# Patient Record
Sex: MALE | Race: ASIAN | NOT HISPANIC OR LATINO | Employment: FULL TIME | URBAN - METROPOLITAN AREA
[De-identification: names, ages, dates, MRNs, and addresses within clinical notes are randomized per-mention and may not be internally consistent; named-entity substitution may affect disease eponyms.]

---

## 2019-10-21 ENCOUNTER — COMMUNICATION - HEALTHEAST (OUTPATIENT)
Dept: TELEHEALTH | Facility: CLINIC | Age: 39
End: 2019-10-21

## 2019-10-21 ENCOUNTER — OFFICE VISIT - HEALTHEAST (OUTPATIENT)
Dept: FAMILY MEDICINE | Facility: CLINIC | Age: 39
End: 2019-10-21

## 2019-10-21 DIAGNOSIS — Z00.00 WELL ADULT EXAM: ICD-10-CM

## 2019-10-21 DIAGNOSIS — F33.0 MILD EPISODE OF RECURRENT MAJOR DEPRESSIVE DISORDER (H): ICD-10-CM

## 2019-10-21 DIAGNOSIS — H90.3 ASYMMETRICAL SENSORINEURAL HEARING LOSS: ICD-10-CM

## 2019-10-21 LAB
ALBUMIN SERPL-MCNC: 4.2 G/DL (ref 3.5–5)
ALP SERPL-CCNC: 106 U/L (ref 45–120)
ALT SERPL W P-5'-P-CCNC: 40 U/L (ref 0–45)
ANION GAP SERPL CALCULATED.3IONS-SCNC: 11 MMOL/L (ref 5–18)
AST SERPL W P-5'-P-CCNC: 19 U/L (ref 0–40)
BILIRUB SERPL-MCNC: 0.8 MG/DL (ref 0–1)
BUN SERPL-MCNC: 13 MG/DL (ref 8–22)
CALCIUM SERPL-MCNC: 9.6 MG/DL (ref 8.5–10.5)
CHLORIDE BLD-SCNC: 105 MMOL/L (ref 98–107)
CHOLEST SERPL-MCNC: 213 MG/DL
CO2 SERPL-SCNC: 22 MMOL/L (ref 22–31)
CREAT SERPL-MCNC: 0.78 MG/DL (ref 0.7–1.3)
ERYTHROCYTE [DISTWIDTH] IN BLOOD BY AUTOMATED COUNT: 10.4 % (ref 11–14.5)
FASTING STATUS PATIENT QL REPORTED: YES
GFR SERPL CREATININE-BSD FRML MDRD: >60 ML/MIN/1.73M2
GLUCOSE BLD-MCNC: 117 MG/DL (ref 70–125)
HCT VFR BLD AUTO: 53.7 % (ref 40–54)
HDLC SERPL-MCNC: 42 MG/DL
HGB BLD-MCNC: 18.3 G/DL (ref 14–18)
LDLC SERPL CALC-MCNC: 146 MG/DL
MCH RBC QN AUTO: 31.9 PG (ref 27–34)
MCHC RBC AUTO-ENTMCNC: 34 G/DL (ref 32–36)
MCV RBC AUTO: 94 FL (ref 80–100)
PLATELET # BLD AUTO: 264 THOU/UL (ref 140–440)
PMV BLD AUTO: 8.2 FL (ref 7–10)
POTASSIUM BLD-SCNC: 4.4 MMOL/L (ref 3.5–5)
PROT SERPL-MCNC: 7 G/DL (ref 6–8)
RBC # BLD AUTO: 5.72 MILL/UL (ref 4.4–6.2)
SODIUM SERPL-SCNC: 138 MMOL/L (ref 136–145)
TRIGL SERPL-MCNC: 127 MG/DL
TSH SERPL DL<=0.005 MIU/L-ACNC: 2.31 UIU/ML (ref 0.3–5)
WBC: 7.5 THOU/UL (ref 4–11)

## 2019-10-21 RX ORDER — BUPROPION HYDROCHLORIDE 150 MG/1
150 TABLET, EXTENDED RELEASE ORAL 2 TIMES DAILY
Qty: 60 TABLET | Refills: 2 | Status: SHIPPED | OUTPATIENT
Start: 2019-10-21

## 2019-10-21 ASSESSMENT — MIFFLIN-ST. JEOR: SCORE: 1670.36

## 2019-10-21 ASSESSMENT — PATIENT HEALTH QUESTIONNAIRE - PHQ9: SUM OF ALL RESPONSES TO PHQ QUESTIONS 1-9: 5

## 2019-10-22 LAB — 25(OH)D3 SERPL-MCNC: 20.6 NG/ML (ref 30–80)

## 2019-10-23 ENCOUNTER — COMMUNICATION - HEALTHEAST (OUTPATIENT)
Dept: FAMILY MEDICINE | Facility: CLINIC | Age: 39
End: 2019-10-23

## 2019-12-02 ENCOUNTER — OFFICE VISIT - HEALTHEAST (OUTPATIENT)
Dept: FAMILY MEDICINE | Facility: CLINIC | Age: 39
End: 2019-12-02

## 2019-12-02 DIAGNOSIS — F32.A DEPRESSION, UNSPECIFIED DEPRESSION TYPE: ICD-10-CM

## 2019-12-02 DIAGNOSIS — H65.93 OME (OTITIS MEDIA WITH EFFUSION), BILATERAL: ICD-10-CM

## 2019-12-02 DIAGNOSIS — Z71.6 ENCOUNTER FOR SMOKING CESSATION COUNSELING: ICD-10-CM

## 2019-12-02 ASSESSMENT — MIFFLIN-ST. JEOR: SCORE: 1661.29

## 2021-05-26 ASSESSMENT — PATIENT HEALTH QUESTIONNAIRE - PHQ9: SUM OF ALL RESPONSES TO PHQ QUESTIONS 1-9: 5

## 2021-05-30 ENCOUNTER — RECORDS - HEALTHEAST (OUTPATIENT)
Dept: ADMINISTRATIVE | Facility: CLINIC | Age: 41
End: 2021-05-30

## 2021-06-02 NOTE — PATIENT INSTRUCTIONS - HE
Advised health diet, 6-8 fruit and vegetables daily and discussed juicing in am with protein powder for breakfast.  Advised regular exercise, discussed regular aerobic exercise and strength training.  Caffeine: not more than 2 daily.  Water: 6-8 glasses daily.  Multivitamin with Vitamin D 2000 units.  ER if symptoms worsen during treatment. Make a verbal contract with a family member today to take you to the ER if you develop any suicidal thoughts and check in with this person daily about your symptoms.

## 2021-06-02 NOTE — PROGRESS NOTES
Labs are normal, a slightly elevated total cholesterol and LDL, try incorporating more fish and fish oil in your daily diet, can take Krill oil supplements to help with this.  Not necessary to start a cholesterol-lowering medication at this time.  Hemoglobin was a bit elevated, could be because you were dehydrated because you were fasting that day.  Try to increase your water to 6 to 8 glasses a day.  Please call results to the patient or send a letter if not reachable by phone.

## 2021-06-02 NOTE — TELEPHONE ENCOUNTER
----- Message from Justyna Dobbs PA-C sent at 10/22/2019  1:16 PM CDT -----  Vitamin D is mildly low, take 2000 units of vitamin D daily, please call results to the patient or send a letter if not reachable by phone.

## 2021-06-02 NOTE — TELEPHONE ENCOUNTER
Labs are normal, a slightly elevated total cholesterol and LDL, try incorporating more fish and fish oil in your daily diet, can take Krill oil supplements to help with this.  Not necessary to start a cholesterol-lowering medication at this time.  Hemoglobin was a bit elevated, could be because you were dehydrated because you were fasting that day.  Try to increase your water to 6 to 8 glasses a day.

## 2021-06-02 NOTE — PROGRESS NOTES
Vitamin D is mildly low, take 2000 units of vitamin D daily, please call results to the patient or send a letter if not reachable by phone.

## 2021-06-03 VITALS
SYSTOLIC BLOOD PRESSURE: 110 MMHG | WEIGHT: 178.5 LBS | HEIGHT: 67 IN | BODY MASS INDEX: 28.02 KG/M2 | DIASTOLIC BLOOD PRESSURE: 70 MMHG | HEART RATE: 62 BPM

## 2021-06-03 NOTE — PROGRESS NOTES
HPI:  Derek Pena is a 39 y.o. male who is seen for   Chief Complaint   Patient presents with     Follow-up   Derek Pena is seen in follow-up to starting Wellbutrin for smoking cessation and depression.  He did not  this medication due to the cost.  He continues to talk to his girlfriend regularly about his depressive symptoms.  He denies suicidal or homicidal he has not looked into other counseling opportunities.  He is continued to smoke.  He also has a new concern of over 1 week of cough, congestion, ear fullness, sinus pressure, malaise.  He does not have fever, nausea, vomiting, shortness of breath, wheezing.  Review of systems as in HPI.    No results found for: HGBA1C  Lab Results   Component Value Date    LDLCALC 146 (H) 10/21/2019    CREATININE 0.78 10/21/2019     There is no problem list on file for this patient.    Family History   Problem Relation Age of Onset     Diabetes Mother      Hypertension Mother      Hyperlipidemia Mother      Social History     Socioeconomic History     Marital status:      Spouse name: Not on file     Number of children: 3     Years of education: Not on file     Highest education level: Not on file   Occupational History     Not on file   Social Needs     Financial resource strain: Not on file     Food insecurity:     Worry: Not on file     Inability: Not on file     Transportation needs:     Medical: Not on file     Non-medical: Not on file   Tobacco Use     Smoking status: Current Every Day Smoker     Packs/day: 0.00     Types: Cigarettes   Substance and Sexual Activity     Alcohol use: Not on file     Drug use: Not on file     Sexual activity: Not on file   Lifestyle     Physical activity:     Days per week: Not on file     Minutes per session: Not on file     Stress: Not on file   Relationships     Social connections:     Talks on phone: Not on file     Gets together: Not on file     Attends Congregational service: Not on file     Active member of club or organization:  Not on file     Attends meetings of clubs or organizations: Not on file     Relationship status: Not on file     Intimate partner violence:     Fear of current or ex partner: No     Emotionally abused: No     Physically abused: No     Forced sexual activity: No   Other Topics Concern     Not on file   Social History Narrative     Not on file     No past surgical history on file.  Current Outpatient Medications on File Prior to Visit   Medication Sig Dispense Refill     buPROPion (WELLBUTRIN SR) 150 MG 12 hr tablet Take 1 tablet (150 mg total) by mouth 2 (two) times a day. 60 tablet 2     No current facility-administered medications on file prior to visit.      No Known Allergies    I have reviewed the patient's medical history in detail and updated the computerized patient record.  OBJECTIVE:  Wt Readings from Last 3 Encounters:   12/02/19 176 lb 8 oz (80.1 kg)   10/21/19 178 lb 8 oz (81 kg)     Temp Readings from Last 3 Encounters:   No data found for Temp     BP Readings from Last 3 Encounters:   12/02/19 120/70   10/21/19 110/70     Pulse Readings from Last 3 Encounters:   10/21/19 62     Body mass index is 28.06 kg/m .     Alert, cooperative, well-hydrated. Appears well.  Eyes: Pupils equal, round, reactive to light.  HEENT: Sclera white, nares patent, MMM, TM's dull and injected bilaterally, EACs are without lesions bilaterally.  Nose: Sinuses are pale, boggy, no erythema or exudates.  Lungs: Clear to auscultation. No retractions, no increased work of respiration, equal chest rise.   Heart: Regular rate and rhythm, no murmurs, clicks,   Gallops.  Extremities: no tenderness to palpation of gastrocnemius, bilaterally.  Skin: no increased warmth, edema, or erythema of lower legs bilaterally.  Mood: Affect flat, no pressured speech, no psychomotor agitation, no suicidal or homicidal ideations.  Labs:  Physical on 10/21/2019   Component Date Value     WBC 10/21/2019 7.5      RBC 10/21/2019 5.72      Hemoglobin  10/21/2019 18.3*     Hematocrit 10/21/2019 53.7      MCV 10/21/2019 94      MCH 10/21/2019 31.9      MCHC 10/21/2019 34.0      RDW 10/21/2019 10.4*     Platelets 10/21/2019 264      MPV 10/21/2019 8.2      Cholesterol 10/21/2019 213*     Triglycerides 10/21/2019 127      HDL Cholesterol 10/21/2019 42      LDL Calculated 10/21/2019 146*     Patient Fasting > 8hrs? 10/21/2019 Yes      TSH 10/21/2019 2.31      Sodium 10/21/2019 138      Potassium 10/21/2019 4.4      Chloride 10/21/2019 105      CO2 10/21/2019 22      Anion Gap, Calculation 10/21/2019 11      Glucose 10/21/2019 117      BUN 10/21/2019 13      Creatinine 10/21/2019 0.78      GFR MDRD Af Amer 10/21/2019 >60      GFR MDRD Non Af Amer 10/21/2019 >60      Bilirubin, Total 10/21/2019 0.8      Calcium 10/21/2019 9.6      Protein, Total 10/21/2019 7.0      Albumin 10/21/2019 4.2      Alkaline Phosphatase 10/21/2019 106      AST 10/21/2019 19      ALT 10/21/2019 40      Vitamin D, Total (25-Hyd* 10/21/2019 20.6*     ASSESMENT/PLAN:  1. OME (otitis media with effusion), bilateral  amoxicillin (AMOXIL) 875 MG tablet   2. Encounter for smoking cessation counseling     3. Depression, unspecified depression type     1.  Discussed the use of amoxicillin for ear infections.  Also gave list of over-the-counter medications to help with cough.  2.  Discussed smoking cessation options, gave 800-number for [place] smoking cessation program.  3.  Discussed his current depressive symptoms and advised him to follow-up in his EAP at work for free counseling or low cost counseling.  Follow-up if symptoms are not improving with current plan.    Justyna Dobbs, MS, PA-C 12/02/19

## 2021-06-03 NOTE — PATIENT INSTRUCTIONS - HE
Nasal saline.  Mucinex, decongestant, antihistamine as needed.  2. Humidifier, push fluids, rest, acetaminophen as needed q 4-6 hours for fever and myalgias.    Quit Plan [place] -  for nicotine patches, gum or lozenges.     Employee Assistance Program - contact your Human Resources Department at work for counseling services which are usually free.

## 2021-06-04 VITALS
DIASTOLIC BLOOD PRESSURE: 70 MMHG | HEIGHT: 67 IN | WEIGHT: 176.5 LBS | BODY MASS INDEX: 27.7 KG/M2 | SYSTOLIC BLOOD PRESSURE: 120 MMHG

## 2021-06-19 NOTE — LETTER
Letter by Justyna Dobbs PA-C at      Author: Justyna Dobbs PA-C Service: -- Author Type: --    Filed:  Encounter Date: 10/23/2019 Status: Signed         [address] [place] [address]             October 23, 2019         Dear Mr. Pena,    Below are the results from your recent visit:    Labs are normal, a slightly elevated total cholesterol and LDL, try incorporating more fish and fish oil in your daily diet, can take Krill oil supplements to help with this.  Not necessary to start a cholesterol-lowering medication at this time.  Hemoglobin was a bit elevated, could be because you were dehydrated because you were fasting that day.  Try to increase your water to 6 to 8 glasses a day.    Resulted Orders   HM2(CBC w/o Differential)   Result Value Ref Range    WBC 7.5 4.0 - 11.0 thou/uL    RBC 5.72 4.40 - 6.20 mill/uL    Hemoglobin 18.3 (H) 14.0 - 18.0 g/dL    Hematocrit 53.7 40.0 - 54.0 %    MCV 94 80 - 100 fL    MCH 31.9 27.0 - 34.0 pg    MCHC 34.0 32.0 - 36.0 g/dL    RDW 10.4 (L) 11.0 - 14.5 %    Platelets 264 140 - 440 thou/uL    MPV 8.2 7.0 - 10.0 fL   Lipid Cascade   Result Value Ref Range    Cholesterol 213 (H) <=199 mg/dL    Triglycerides 127 <=149 mg/dL    HDL Cholesterol 42 >=40 mg/dL    LDL Calculated 146 (H) <=129 mg/dL    Patient Fasting > 8hrs? Yes    Thyroid Cascade   Result Value Ref Range    TSH 2.31 0.30 - 5.00 uIU/mL   Comprehensive Metabolic Panel   Result Value Ref Range    Sodium 138 136 - 145 mmol/L    Potassium 4.4 3.5 - 5.0 mmol/L    Chloride 105 98 - 107 mmol/L    CO2 22 22 - 31 mmol/L    Anion Gap, Calculation 11 5 - 18 mmol/L    Glucose 117 70 - 125 mg/dL    BUN 13 8 - 22 mg/dL    Creatinine 0.78 0.70 - 1.30 mg/dL    GFR MDRD Af Amer >60 >60 mL/min/1.73m2    GFR MDRD Non Af Amer >60 >60 mL/min/1.73m2    Bilirubin, Total 0.8 0.0 - 1.0 mg/dL    Calcium 9.6 8.5 - 10.5 mg/dL    Protein, Total 7.0 6.0 - 8.0 g/dL    Albumin 4.2 3.5 - 5.0 g/dL    Alkaline  Phosphatase 106 45 - 120 U/L    AST 19 0 - 40 U/L    ALT 40 0 - 45 U/L    Narrative    Fasting Glucose reference range is 70-99 mg/dL per  American Diabetes Association (ADA) guidelines.           Please call with questions or contact us using [facility].    Sincerely,        Electronically signed by Justyna Dobbs PA-C

## 2021-06-28 NOTE — PROGRESS NOTES
Progress Notes by Justyna Dobbs PA-C at 10/21/2019  8:50 AM     Author: Justyna Dobbs PA-C Service: -- Author Type: Physician Assistant    Filed: 10/23/2019  2:03 PM Encounter Date: 10/21/2019 Status: Signed    : Justyna Dobbs PA-C (Physician Assistant)       MALE PREVENTATIVE EXAM    Assessment and Plan:       1. Well adult exam  Advised health diet, 6-8 fruit and vegetables daily and discussed juicing in am with protein powder for breakfast.  Advised regular exercise, discussed regular aerobic exercise and strength training.  Advised not more than 1 alcoholic drinks in any given day.  Caffeine: not more than 2 daily.  Water: 6-8 glasses daily.  Multivitamin with Vitamin D 2000 units and iron for women.      - HM2(CBC w/o Differential); Future  - Lipid Cascade; Future  - Thyroid [place]; Future  - Comprehensive Metabolic Panel; Future  - HM2(CBC w/o Differential)  - Lipid Cascade  - Thyroid [place]  - Comprehensive Metabolic Panel    2. Mild episode of recurrent major depressive disorder (H)  Advised on use of Wellbutrin for depression and smoking cessation. Reviewed side effects. ER if symptoms worsen during treatment. Make a verbal contract with a family member today to take you to the ER if you develop any suicidal thoughts and check in with this person daily about your symptoms.  Follow up for recheck in 2 weeks.    - Vitamin D, Total (25-Hydroxy); Future  - buPROPion (WELLBUTRIN SR) 150 MG 12 hr tablet; Take 1 tablet (150 mg total) by mouth 2 (two) times a day.  Dispense: 60 tablet; Refill: 2  - PHQ9 Depression Screen  - Vitamin D, Total (25-Hydroxy)    3. Asymmetrical sensorineural hearing loss  Advised to bring reports from his work assessments. Use best available hearing protection daily at work.   - Ambulatory referral to Audiology     Next follow up:  Return in about 2 weeks (around 11/4/2019) for Recheck, Depression, recheck with E-Visit; 6 week  recheck.    Immunization  Review  Adult Imm Review: Due today, orders placed  counseled on smoking cessation and Wellbutrin started    I discussed the following with the patient:   Adult Healthy Living: Importance of regular exercise  Healthy nutrition  Supplement use  smoking cessation        Subjective:   Chief Complaint: Derek Pena is an 39 y.o. male here for a preventative health visit.. he ehas se     HPI:  Derek Pena is seen for a physical. He has had a history of smoking 1/2 ppd of cigarettes for 15 years. 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 He is  and has 3 children. He does walk for activity with his children and his oldest is starting soccer. He has a history of depression and would like to consider treatment and quitting smoking.   Little interest or pleasure in doing things: Several days  Feeling down, depressed, or hopeless: Several days  Trouble falling or staying asleep, or sleeping too much: Not at all  Feeling tired or having little energy: Several days  Poor appetite or overeating: Not at all  Feeling bad about yourself - or that you are a failure or have let yourself or your family down: Several days  Trouble concentrating on things, such as reading the newspaper or watching television: Several days  Moving or speaking so slowly that other people could have noticed. Or the opposite - being so fidgety or restless that you have been moving around a lot more than usual: Not at all  Thoughts that you would be better off dead, or of hurting yourself in some way: Not at all  PHQ-9 Total Score: 5  ROS: Patient denies fever, chills, sweats, fainting, fatigue, weight change, dizziness, sleep problems, chest pain, palpitations, shortness of breath, wheezing, cough,  sore throat, changes in hearing, ear pain,tinnitus,  disphagia, sore throat, globus, changes in vision, eye pain eye  "redness, acid reflux, nausea, vomiting, diarrhea, constipation, black or bloody stools,  Dysuria, frequency, urinary incontinence, nocturia, hematuria, back pain,joint pain, bone pain, muscle cramps,edema, weakness, numbness, tingling of extremities, rash, itching, skin changes, swollen lymph nodes, thirst, increased urination, breast lumps, breast pain, nipple discharge, memory difficulties, anxiety, mood swings, (female)vaginal discharge, dyspareunia, menorrhagia, pelvic pain, sexual dysfunction,   (male) testicular lumps, erectile dysfunction.      Healthy Habits  Are you taking a daily aspirin? No  Do you typically exercising at least 40 min, 3-4 times per week?  NO  Do you usually eat at least 4 servings of fruit and vegetables a day, include whole grains and fiber and avoid regularly eating high fat foods? NO  Have you had an eye exam in the past two years? NO  Do you see a dentist twice per year? NO  Do you have any concerns regarding sleep? No    Safety Screen    No data recorded    Review of Systems:  Please see above.  The rest of the review of systems are negative for all systems.     Cancer Screening     Patient has no health maintenance due at this time          Patient Care Team:  Provider, No Primary Care as PCP - General        History     Reviewed By Date/Time Sections Reviewed    Justyna Dobbs PA-C 10/23/2019  2:01 PM Relationships    Justyna Dobbs PA-C 10/23/2019  2:00 PM Darlene Montoya 10/21/2019  8:40 AM Alcohol, Drug Use, Sexual Activity            Objective:   Vital Signs:   Visit Vitals  /70 (Patient Site: Right Arm, Patient Position: Sitting, Cuff Size: Adult Regular)   Pulse 62   Ht 5' 6.5\" (1.689 m)   Wt 178 lb 8 oz (81 kg)   BMI 28.38 kg/m           PHYSICAL EXAM  PHYSICAL EXAM  Alert, cooperative, well-hydrated.  Appears well.  Eyes: Pupils equal, round, reactive to light.  HEENT: Sclera white, nares patent, MMM, TM's pearly bilaterally, neck " supple, no lymph adenopathy, thyroid without organomegaly and freely movable, no nodules or masses.   Lungs: Clear to auscultation. No retractions, no increased work of respiration, equal chest rise.   Heart: Regular rate and rhythm, no murmurs, clicks,    Gallops.Sitting/StandingiSupine.  Abdomen: Soft, bowel sounds in 4 quadrants with no tenderness to palpation, no organomegaly or masses, no aortic or renal bruits.  Extremities: no tenderness to palpation of gastrocnemius, bilaterally.  Skin: no increased warmth, edema, or erythema of lower legs bilaterally.  Back:  No cervical, thoracic or lumbar tenderness to spinous processes or musculature.    Neuro::pupils equal and reactive to light bilaterally,EOMFI, no nystagmus, no strabismus,  CN II - XII grossly intact. No focal motor/sensory deficits. Rhomberg negative.    Mood: affect flat, good eye contact, converses well, no pressured speech, no psychomotor agitation, no suicidal or homicidal ideations.             Medication List          Accurate as of October 21, 2019 11:59 PM. If you have any questions, ask your nurse or doctor.            START taking these medications    buPROPion 150 MG 12 hr tablet  Also known as:  WELLBUTRIN SR  INSTRUCTIONS:  Take 1 tablet (150 mg total) by mouth 2 (two) times a day.  Started by:  Justyna Dobbs PA-C              Where to Get Your Medications      These medications were sent to [id number] - [place], [place] - [address] AT [facility] [address]  [address], [place] [address]    Phone:  [phone]     buPROPion 150 MG 12 hr tablet         Additional Screenings Completed Today:

## 2021-08-22 ENCOUNTER — HEALTH MAINTENANCE LETTER (OUTPATIENT)
Age: 41
End: 2021-08-22

## 2021-10-17 ENCOUNTER — HEALTH MAINTENANCE LETTER (OUTPATIENT)
Age: 41
End: 2021-10-17

## 2022-10-02 ENCOUNTER — HEALTH MAINTENANCE LETTER (OUTPATIENT)
Age: 42
End: 2022-10-02

## 2023-10-21 ENCOUNTER — HEALTH MAINTENANCE LETTER (OUTPATIENT)
Age: 43
End: 2023-10-21

## 2024-01-17 ENCOUNTER — OFFICE VISIT (OUTPATIENT)
Dept: FAMILY MEDICINE | Facility: CLINIC | Age: 44
End: 2024-01-17
Payer: COMMERCIAL

## 2024-01-17 VITALS
OXYGEN SATURATION: 96 % | WEIGHT: 173 LBS | BODY MASS INDEX: 27.5 KG/M2 | TEMPERATURE: 100.2 F | RESPIRATION RATE: 16 BRPM | SYSTOLIC BLOOD PRESSURE: 109 MMHG | HEART RATE: 80 BPM | DIASTOLIC BLOOD PRESSURE: 72 MMHG

## 2024-01-17 DIAGNOSIS — J03.90 TONSILLITIS: ICD-10-CM

## 2024-01-17 DIAGNOSIS — J02.9 SORE THROAT: ICD-10-CM

## 2024-01-17 DIAGNOSIS — J02.0 STREP THROAT: Primary | ICD-10-CM

## 2024-01-17 LAB — DEPRECATED S PYO AG THROAT QL EIA: POSITIVE

## 2024-01-17 PROCEDURE — 99203 OFFICE O/P NEW LOW 30 MIN: CPT | Mod: 25 | Performed by: PHYSICIAN ASSISTANT

## 2024-01-17 PROCEDURE — 87880 STREP A ASSAY W/OPTIC: CPT | Performed by: PHYSICIAN ASSISTANT

## 2024-01-17 PROCEDURE — 96372 THER/PROPH/DIAG INJ SC/IM: CPT | Performed by: PHYSICIAN ASSISTANT

## 2024-01-17 RX ORDER — PENICILLIN V POTASSIUM 500 MG/1
500 TABLET, FILM COATED ORAL 2 TIMES DAILY
Qty: 20 TABLET | Refills: 0 | Status: SHIPPED | OUTPATIENT
Start: 2024-01-17 | End: 2024-01-27

## 2024-01-17 RX ORDER — DEXAMETHASONE SODIUM PHOSPHATE 10 MG/ML
10 INJECTION INTRAMUSCULAR; INTRAVENOUS ONCE
Status: COMPLETED | OUTPATIENT
Start: 2024-01-17 | End: 2024-01-17

## 2024-01-17 RX ORDER — CEFTRIAXONE SODIUM 1 G
1 VIAL (EA) INJECTION ONCE
Status: COMPLETED | OUTPATIENT
Start: 2024-01-17 | End: 2024-01-17

## 2024-01-17 RX ADMIN — DEXAMETHASONE SODIUM PHOSPHATE 10 MG: 10 INJECTION INTRAMUSCULAR; INTRAVENOUS at 12:07

## 2024-01-17 RX ADMIN — Medication 1 G: at 12:06

## 2024-01-17 NOTE — PROGRESS NOTES
Patient presents with:  Pharyngitis: X 2 days       Clinical Decision Making:  Patient quite enlarged tonsils with 3+ tonsils and white exudate.  Positive strep test.  It is treated with Rocephin 1 g IM and dexamethasone 10 mg IM in the office.  Will transition to oral Pen-Vee K for treatment for the strep throat. Expected course of resolution and indication for return was gone over and questions were answered to patient/parent's satisfaction before discharge.        ICD-10-CM    1. Strep throat  J02.0 penicillin V (VEETID) 500 MG tablet      2. Sore throat  J02.9 Streptococcus A Rapid Screen w/Reflex to PCR - Clinic Collect      3. Tonsillitis  J03.90 cefTRIAXone (ROCEPHIN) injection 1 g     dexAMETHasone (DECADRON) injection 10 mg     penicillin V (VEETID) 500 MG tablet          Patient Instructions   Suggested increased rest increased fluids and bedside humidification  Over-the-counter Tylenol for comfort.  Follow packaging directions  Over-the-counter throat lozenges with benzocaine, such as Cepacol, may be used if indicated and is not a choking hazard based on age.    Follow packaging directions.    Do not overuse the benzocaine as it will dry the throat and make it uncomfortable.  May use the throat lozenges one lozenge per hour.  May use hard candies or lemon drops etc. to keep the saliva flowing for comfort until the next hour interval dosing for the lozenge  Noncontagious after 24 hours on the antibiotic.  Change toothbrush out after 48 hours to avoid reinfecting the mouth.  Follow-up after completion of the antibiotic if not completely resolved with symptoms or new symptoms or concerns arise.     HPI:  Derek Pena is a 43 year old male who presents today for 2-day history of sore throat and odynophagia.  Patient is still handling his own secretions has had a fever, headache and cough and fatigue.  Has not having vomiting diarrhea skin rash abdominal pain or urinary symptoms.  No treatments tried for this at  home.    History obtained from chart review and the patient.    Problem List:  There are no relevant problems documented for this patient.      History reviewed. No pertinent past medical history.    Social History     Tobacco Use    Smoking status: Every Day     Packs/day: 0     Types: Cigarettes    Smokeless tobacco: Not on file   Substance Use Topics    Alcohol use: Not on file       Review of Systems  As above in HPI otherwise negative.    Vitals:    01/17/24 1146   BP: 109/72   Pulse: 80   Resp: 16   Temp: 100.2  F (37.9  C)   SpO2: 96%   Weight: 78.5 kg (173 lb)       General: Patient is resting comfortably no acute distress is afebrile  HEENT: Head is normocephalic atraumatic   eyes are PERRL EOMI sclera anicteric   TMs are clear bilaterally  Throat is with pharyngeal wall erythema and exudate tonsils are 3+ uvula is midline there is no soft palate deformation.  Positive cervical lymphadenopathy and tenderness to palpation  No cervical lymphadenopathy present  LUNGS: Clear to auscultation bilaterally  HEART: Regular rate and rhythm  Skin: Without rash non-diaphoretic    Physical Exam      Labs:  Results for orders placed or performed in visit on 01/17/24   Streptococcus A Rapid Screen w/Reflex to PCR - Clinic Collect     Status: Abnormal    Specimen: Throat; Swab   Result Value Ref Range    Group A Strep antigen Positive (A) Negative     At the end of the encounter, I discussed results, diagnosis, medications. Discussed red flags for immediate return to clinic/ER, as well as indications for follow up if no improvement. Patient understood and agreed to plan. Patient was stable for discharge.

## 2024-01-17 NOTE — LETTER
January 17, 2024      Derek Pena  [facility] [place] [address]        To Whom It May Concern:    Derek Pena was seen in our clinic. He may return to work without restrictions 1/18/24.      Sincerely,        Usama Goodman PA-C

## 2024-01-17 NOTE — PATIENT INSTRUCTIONS
Suggested increased rest increased fluids and bedside humidification  Over-the-counter Tylenol for comfort.  Follow packaging directions  Over-the-counter throat lozenges with benzocaine, such as Cepacol, may be used if indicated and is not a choking hazard based on age.    Follow packaging directions.    Do not overuse the benzocaine as it will dry the throat and make it uncomfortable.  May use the throat lozenges one lozenge per hour.  May use hard candies or lemon drops etc. to keep the saliva flowing for comfort until the next hour interval dosing for the lozenge  Noncontagious after 24 hours on the antibiotic.  Change toothbrush out after 48 hours to avoid reinfecting the mouth.  Follow-up after completion of the antibiotic if not completely resolved with symptoms or new symptoms or concerns arise.

## 2024-12-14 ENCOUNTER — HEALTH MAINTENANCE LETTER (OUTPATIENT)
Age: 44
End: 2024-12-14